# Patient Record
Sex: FEMALE | Race: OTHER | HISPANIC OR LATINO
[De-identification: names, ages, dates, MRNs, and addresses within clinical notes are randomized per-mention and may not be internally consistent; named-entity substitution may affect disease eponyms.]

---

## 2023-08-17 ENCOUNTER — RESULT REVIEW (OUTPATIENT)
Age: 23
End: 2023-08-17

## 2023-08-18 ENCOUNTER — TRANSCRIPTION ENCOUNTER (OUTPATIENT)
Age: 23
End: 2023-08-18

## 2023-08-20 ENCOUNTER — RESULT REVIEW (OUTPATIENT)
Age: 23
End: 2023-08-20

## 2023-08-21 ENCOUNTER — TRANSCRIPTION ENCOUNTER (OUTPATIENT)
Age: 23
End: 2023-08-21

## 2023-08-22 ENCOUNTER — TRANSCRIPTION ENCOUNTER (OUTPATIENT)
Age: 23
End: 2023-08-22

## 2023-09-01 PROBLEM — Z00.00 ENCOUNTER FOR PREVENTIVE HEALTH EXAMINATION: Status: ACTIVE | Noted: 2023-09-01

## 2023-09-05 ENCOUNTER — APPOINTMENT (OUTPATIENT)
Dept: SURGERY | Facility: CLINIC | Age: 23
End: 2023-09-05

## 2023-09-28 ENCOUNTER — APPOINTMENT (OUTPATIENT)
Dept: BARIATRICS | Facility: CLINIC | Age: 23
End: 2023-09-28
Payer: COMMERCIAL

## 2023-09-28 PROCEDURE — 97802 MEDICAL NUTRITION INDIV IN: CPT | Mod: 95

## 2023-09-29 ENCOUNTER — APPOINTMENT (OUTPATIENT)
Dept: BARIATRICS | Facility: CLINIC | Age: 23
End: 2023-09-29
Payer: COMMERCIAL

## 2023-09-29 ENCOUNTER — NON-APPOINTMENT (OUTPATIENT)
Age: 23
End: 2023-09-29

## 2023-09-29 VITALS
WEIGHT: 286 LBS | BODY MASS INDEX: 47.65 KG/M2 | HEART RATE: 90 BPM | HEIGHT: 65 IN | DIASTOLIC BLOOD PRESSURE: 57 MMHG | SYSTOLIC BLOOD PRESSURE: 131 MMHG

## 2023-09-29 DIAGNOSIS — K21.9 GASTRO-ESOPHAGEAL REFLUX DISEASE W/OUT ESOPHAGITIS: ICD-10-CM

## 2023-09-29 PROCEDURE — 99214 OFFICE O/P EST MOD 30 MIN: CPT | Mod: 24

## 2023-10-12 ENCOUNTER — APPOINTMENT (OUTPATIENT)
Dept: BARIATRICS | Facility: HOSPITAL | Age: 23
End: 2023-10-12

## 2023-10-13 ENCOUNTER — APPOINTMENT (OUTPATIENT)
Dept: BARIATRICS | Facility: CLINIC | Age: 23
End: 2023-10-13

## 2023-10-30 ENCOUNTER — APPOINTMENT (OUTPATIENT)
Dept: BARIATRICS | Facility: CLINIC | Age: 23
End: 2023-10-30

## 2023-11-02 ENCOUNTER — APPOINTMENT (OUTPATIENT)
Dept: BARIATRICS | Facility: CLINIC | Age: 23
End: 2023-11-02

## 2023-11-06 ENCOUNTER — APPOINTMENT (OUTPATIENT)
Dept: BARIATRICS | Facility: CLINIC | Age: 23
End: 2023-11-06

## 2023-12-13 ENCOUNTER — RESULT REVIEW (OUTPATIENT)
Age: 23
End: 2023-12-13

## 2023-12-14 ENCOUNTER — RESULT REVIEW (OUTPATIENT)
Age: 23
End: 2023-12-14

## 2023-12-14 ENCOUNTER — APPOINTMENT (OUTPATIENT)
Dept: BARIATRICS | Facility: HOSPITAL | Age: 23
End: 2023-12-14
Payer: COMMERCIAL

## 2023-12-14 PROCEDURE — 43239 EGD BIOPSY SINGLE/MULTIPLE: CPT

## 2023-12-19 ENCOUNTER — APPOINTMENT (OUTPATIENT)
Dept: BARIATRICS | Facility: CLINIC | Age: 23
End: 2023-12-19
Payer: COMMERCIAL

## 2023-12-19 PROCEDURE — 97803 MED NUTRITION INDIV SUBSEQ: CPT | Mod: 95

## 2024-01-02 ENCOUNTER — APPOINTMENT (OUTPATIENT)
Dept: CARDIOLOGY | Facility: CLINIC | Age: 24
End: 2024-01-02
Payer: COMMERCIAL

## 2024-01-02 ENCOUNTER — NON-APPOINTMENT (OUTPATIENT)
Age: 24
End: 2024-01-02

## 2024-01-02 VITALS
OXYGEN SATURATION: 98 % | BODY MASS INDEX: 48.32 KG/M2 | HEART RATE: 72 BPM | DIASTOLIC BLOOD PRESSURE: 75 MMHG | SYSTOLIC BLOOD PRESSURE: 109 MMHG | HEIGHT: 65 IN | WEIGHT: 290 LBS

## 2024-01-02 VITALS — DIASTOLIC BLOOD PRESSURE: 73 MMHG | SYSTOLIC BLOOD PRESSURE: 97 MMHG

## 2024-01-02 DIAGNOSIS — Z13.220 ENCOUNTER FOR SCREENING FOR LIPOID DISORDERS: ICD-10-CM

## 2024-01-02 DIAGNOSIS — F17.290 NICOTINE DEPENDENCE, OTHER TOBACCO PRODUCT, UNCOMPLICATED: ICD-10-CM

## 2024-01-02 PROCEDURE — 99204 OFFICE O/P NEW MOD 45 MIN: CPT

## 2024-01-02 PROCEDURE — 93000 ELECTROCARDIOGRAM COMPLETE: CPT

## 2024-01-02 NOTE — HISTORY OF PRESENT ILLNESS
[Preoperative Visit] : for a medical evaluation prior to surgery [Scheduled Procedure ___] : a [unfilled] [Date of Surgery ___] : on [unfilled] [Surgeon Name ___] : surgeon: [unfilled] [FreeTextEntry1] : Patient denies any history of congenital heart disease or childhood cardiac illnesses.   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 pylori today

## 2024-01-02 NOTE — PHYSICAL EXAM
[Well Groomed] : well groomed [General Appearance - Well Nourished] : well nourished [No Deformities] : no deformities [General Appearance - In No Acute Distress] : no acute distress [Normal Conjunctiva] : the conjunctiva exhibited no abnormalities [Eyelids - No Xanthelasma] : the eyelids demonstrated no xanthelasmas [Normal Oral Mucosa] : normal oral mucosa [No Oral Pallor] : no oral pallor [No Oral Cyanosis] : no oral cyanosis [Normal Jugular Venous A Waves Present] : normal jugular venous A waves present [Normal Jugular Venous V Waves Present] : normal jugular venous V waves present [No Jugular Venous Guardado A Waves] : no jugular venous guardado A waves [Respiration, Rhythm And Depth] : normal respiratory rhythm and effort [Exaggerated Use Of Accessory Muscles For Inspiration] : no accessory muscle use [Auscultation Breath Sounds / Voice Sounds] : lungs were clear to auscultation bilaterally [Heart Rate And Rhythm] : heart rate and rhythm were normal [Heart Sounds] : normal S1 and S2 [Murmurs] : no murmurs present [Abdomen Soft] : soft [Abdomen Tenderness] : non-tender [Abdomen Mass (___ Cm)] : no abdominal mass palpated [Abnormal Walk] : normal gait [Gait - Sufficient For Exercise Testing] : the gait was sufficient for exercise testing [Nail Clubbing] : no clubbing of the fingernails [Cyanosis, Localized] : no localized cyanosis [Petechial Hemorrhages (___cm)] : no petechial hemorrhages [Skin Color & Pigmentation] : normal skin color and pigmentation [] : no rash [No Venous Stasis] : no venous stasis [Skin Lesions] : no skin lesions [No Skin Ulcers] : no skin ulcer [No Xanthoma] : no  xanthoma was observed [Oriented To Time, Place, And Person] : oriented to person, place, and time [Affect] : the affect was normal [Mood] : the mood was normal [No Anxiety] : not feeling anxious [FreeTextEntry1] : Obese

## 2024-01-08 ENCOUNTER — RESULT CHARGE (OUTPATIENT)
Age: 24
End: 2024-01-08

## 2024-01-09 ENCOUNTER — RESULT REVIEW (OUTPATIENT)
Age: 24
End: 2024-01-09

## 2024-01-19 ENCOUNTER — APPOINTMENT (OUTPATIENT)
Dept: CARDIOLOGY | Facility: CLINIC | Age: 24
End: 2024-01-19
Payer: COMMERCIAL

## 2024-01-19 DIAGNOSIS — R06.09 OTHER FORMS OF DYSPNEA: ICD-10-CM

## 2024-01-19 DIAGNOSIS — Z01.810 ENCOUNTER FOR PREPROCEDURAL CARDIOVASCULAR EXAMINATION: ICD-10-CM

## 2024-01-19 PROCEDURE — 93015 CV STRESS TEST SUPVJ I&R: CPT

## 2024-01-29 ENCOUNTER — APPOINTMENT (OUTPATIENT)
Dept: BARIATRICS | Facility: CLINIC | Age: 24
End: 2024-01-29
Payer: COMMERCIAL

## 2024-01-29 PROCEDURE — 97803 MED NUTRITION INDIV SUBSEQ: CPT | Mod: 95

## 2024-02-05 ENCOUNTER — APPOINTMENT (OUTPATIENT)
Dept: BARIATRICS | Facility: CLINIC | Age: 24
End: 2024-02-05
Payer: COMMERCIAL

## 2024-02-05 PROCEDURE — 90791 PSYCH DIAGNOSTIC EVALUATION: CPT

## 2024-02-05 NOTE — CURRENT PSYCHIATRIC SYMPTOMS
[Depressed Mood] : no depressed mood [Anhedonia] : no anhedonia [Guilt] : not feeling guilty [Decreased Concentration] : no decrease in concentrating ability [Hyperphagia] : no hyperphagia [Insomnia] : no insomnia disorder [Hypersomnia] : no ~T hypersomnia [Psychomotor Agitation] : no agitation [Psychomotor Retardation] : no psychomotor retardation [Anorexia] : no anorexia [Euphoria] : no euphoria [Highly Irritable] : no high irritability [Increased Activity] : no increased in activity [Distractibility] : not distracted [Talkativeness] : no talkativeness [Grandeur] : no feelings of grandeur [Buying Sprees] : no buying sprees [Hypersexuality] : denied hypersexuality [Dec Need For Sleep] : no decreased need for sleep [Delusions] : no ~T delusions [Hallucination Visual] : no visual hallucinations [Hallucination Auditory] : no auditory hallucinations [Hallucination Tactile] : no tactile hallucinations [Thought Disorder] : ~T a thought disorder was not noted [Excessive Worry] : no excessive worries [Ruminations] : no rumination disorder [Obsessions] : no obsessions [Re-experiencing] : no re-experiencing [Restlessness] : no restlessness [Hypochondriasis] : no hypochondriasis [Panic] : no panic disorder [FreeTextEntry1] : PPH: was depressed at 14 yrs x 2-3 when she first moved to the [place]. She did not get treatment. Denies any other episodes of depression. Denies depressed mood currently.  No psych med trials. Denies past SIB/SA. Denies current SI/HI Denies past PER visits/psych hospitalizations.  Tobacco: denies smoking cigarettes Occasional hookah smoking Alcohol: denies Drugs: denies  FH: denies; denies FH of addiction.

## 2024-02-05 NOTE — HISTORY OF PRESENT ILLNESS
[de-identified] : CC: "I have been trying to lose weight for more than 5 years."  23 yo F with no PPH, PMH of obesity and asthma, who presents for psychiatric clearance for bariatric surgery.   Weight and Diet history Patient reports that she was not overweight as a child. She started gaining weight when she was 14. At that time, she came to the [place] with her father, leaving her mother and brother in the [place]. She reports that she became depressed and mainly stayed in her room, eating.  Her weight topped at 240 lb around 16 years of age. She started to attempt to lose weight around [age] yrs. She attempted to diet, eat more veggies and lean meats. She reduced consumption of carbs. Patent reports that she lost about 50 lb. She was able to maintain the weight by eating healthy for 2-3 years until she started working nights.  At that point, she started to gain weight due to poor eating habits (eating out). Her current weight is 288 lb. Her highest weight has been 295 lb.   Eating and activity habits For breakfast she has yogurt and fruit. For lunch, she has salad with chicken or rice and beans. For dinner she has a protein shake. She states that she occasionally has small pretzels for a snack and once a month she has chocolate during her period. She describes her portion size as "big". She reports that she has cut out take out food. She reports occasional grazing behavior. On a given day, she typically has 3 meals and 1 snack. Pt reports drinking 1 cup of juice. She denies drinking soda. She buys grocery and cooks during the work week and during the weekend, her grandmother cooks and serves big portions. She does reports eating more when she is depressed. She denies binge eating. She denies night eating syndrome. Denies compensatory behaviors. Denies eating d/o.   Changes made thus far and changes needed for success The patient was aware of some of the permanent lifestyle changes needed to achieve maximum benefit from surgery. She started drinking a protein shake in place of dinner. Patient plans to buy a scale to weight her portions. She has also decreased her portion size. Patient plans to join a gym after surgery.   Knowledge and Motivation for surgery The patient is aware of the nature of bariatric surgery. She was not aware potential risks/benefits and related complications. The patient is aware of the changes that must be made in the eating and lifestyle habits, both short- and long- term. Her target weight is 140-150 lb. She hopes to improve physical endurance and improve fatigue.   Support system A close friend of hers had bariatric surgery last year and is supporting her through this process. Her grandmother will support her at home. She denies any recent or upcoming psychosocial changes. She is able to take PTO from work.   Labs and vitals reviewed.

## 2024-02-05 NOTE — REASON FOR VISIT
[FreeTextEntry2] : Patient with morbid obesity for many years has tried and failed numerous diets and is now seeking surgical weight loss is not the first time she actually did see the [facility] group at [place] about 4 5 years ago but due to concern and anxiety decided not to go through with it she is only gained more weight since and more recently has developed common bile duct stones with cholelithiasis and required ERCP with cholecystectomy at this time she feels she has no other alternative but to proceed with surgical weight loss

## 2024-02-05 NOTE — HISTORY OF PRESENT ILLNESS
[de-identified] : Patient presents for discussion regarding surgical weight loss. Patient has been significantly overweight for   years, has tried and failed numerous non surgical weight loss approaches, and now has or is at significant risk for life threatening medical conditions as a result of their weight. They are now ready to commit to the evaluation process that will lead towards the approval for sleeve gastrectomy. [de-identified] : As already noted patient is 23 years old has morbid obesity with a BMI approaching 48 recently underwent cholecystectomy and common bile duct stone clearance does not use drugs alcohol or smokes she is under no psychological or psychiatric evaluation or counseling at this time and takes no medications

## 2024-02-05 NOTE — SOCIAL HISTORY
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

## 2024-02-05 NOTE — ASSESSMENT
[FreeTextEntry1] : Patient presents for discussion regarding surgical weight loss. Patient has been significantly overweight for   years, has tried and failed numerous non surgical weight loss approaches, and now has or is at significant risk for life threatening medical conditions as a result of their weight. They are now ready to commit to the evaluation process that will lead towards the approval for sleeve gastrectomy. I have ordered lab battery for her additionally have oriented in upper GI x-ray order I will schedule her for an endoscopy she will see our psychiatrist she will also have an upper GI and follow-up with our dietitian we will confirm her insurance requirements which might require a 5% weight loss but that is to be determined as well as a number of dietary visits required I had extensive discussion with her over both gastric sleeve and gastric bypass procedures after hearing the risk benefits and alternatives the patient is elected to proceed with sleeve gastrectomy at this time.  Patient is aware that pregnancy must be avoided and birth control exercised for 18 months after surgery.

## 2024-02-05 NOTE — DISCUSSION/SUMMARY
----- Message from BRUNA Hernandez sent at 11/28/2017 11:20 AM CST -----  Please notify patient of normal test results- both her MRI of the brain and her mammogram were normal. No sign of acute cause of her headaches on MRI. I would recommend that she see a neurologist for further evaluation, considering she continues to have daily severe headaches. Referral placed, she can call physician referral line to schedule with new provider: Physician referral line: [phone].  Thank you!   Sandi   [FreeTextEntry1] : Based on the information provided at this time, there are no psychiatric contraindications to proceeding with bariatric surgery.  It is recommended that the patient review information about the procedure and its associated risks/complications.  [de-identified] : E66.9 - obesity, unspecified F54 - Psychological and behavioral factors associated with disorders or diseases classified elsewhere

## 2024-02-05 NOTE — REASON FOR VISIT
[Initial Consult] : an initial consult for [Morbid Obesity (BMI>40)] : morbid obesity (bmi>40) [Referring By:  ___] : ~[address]~ was referred for psychological evaluation by Dr. MENJIVAR [Attempted Weight Loss] : attempted weight loss [Commitment to Modified Lifestyle] : commitment to a modified lifestyle pre and post surgery [Difficulties with Diet Compliance] : difficulties with diet compliance  [Expectations of Outcome] : expectations of outcome [Motivation for Selecting Surgery] : motivation for selecting surgery [Strength of Social Support System] : strength of social support system [Patient Understands Data May be Shared] : patient understands that the information discussed during the evaluation would be shared with referring provider and possibly with ~his/her~ insurance provider

## 2024-02-15 ENCOUNTER — TRANSCRIPTION ENCOUNTER (OUTPATIENT)
Age: 24
End: 2024-02-15

## 2024-02-15 ENCOUNTER — APPOINTMENT (OUTPATIENT)
Dept: BARIATRICS | Facility: CLINIC | Age: 24
End: 2024-02-15
Payer: COMMERCIAL

## 2024-02-15 VITALS — DIASTOLIC BLOOD PRESSURE: 59 MMHG | HEART RATE: 67 BPM | SYSTOLIC BLOOD PRESSURE: 121 MMHG | OXYGEN SATURATION: 98 %

## 2024-02-15 DIAGNOSIS — A04.8 OTHER SPECIFIED BACTERIAL INTESTINAL INFECTIONS: ICD-10-CM

## 2024-02-15 DIAGNOSIS — K21.9 GASTRO-ESOPHAGEAL REFLUX DISEASE W/OUT ESOPHAGITIS: ICD-10-CM

## 2024-02-15 DIAGNOSIS — Z78.9 OTHER SPECIFIED HEALTH STATUS: ICD-10-CM

## 2024-02-15 PROCEDURE — 99214 OFFICE O/P EST MOD 30 MIN: CPT

## 2024-02-15 NOTE — HISTORY OF PRESENT ILLNESS
[de-identified] : Has completed her evaluations, EGD Nl, H. Pylori treated, UGI nl, Stress test nl, psych cleared, labs fine and will be updated at PST, TTE tomorrow, not on hormones, informed about avoiding pregnancy for 18 months postop, vitamins, protein, and liquids discussed.  The patient has undergone extensive evaluation by a multidisciplinary team and having been found qualified is now ready to proceed with laparoscopic sleeve gastrectomy for weight loss. An extensive discussion including the risks of surgery including: need for compliance with diet, vitamins, followup appointments, and also the risks of anesthesia, bleeding, transfusion, infection, leak, visceral injury, reoperation, open procedure, wound issues, inadequate weight loss or weight regain, GERD, hiatal hernia, cardiopulmonary complications, thromboembolism, revision surgery in the future, failure of resolution of some or all comorbid conditions, and even death. The patient understands and wishes to proceed.

## 2024-02-15 NOTE — ASSESSMENT
[FreeTextEntry1] : The patient has undergone extensive evaluation by a multidisciplinary team and having been found qualified is now ready to proceed with laparoscopic sleeve gastrectomy for weight loss. An extensive discussion including the risks of surgery including: need for compliance with diet, vitamins, followup appointments, and also the risks of anesthesia, bleeding, transfusion, infection, leak, visceral injury, reoperation, open procedure, wound issues, inadequate weight loss or weight regain, GERD, hiatal hernia, cardiopulmonary complications, thromboembolism, revision surgery in the future, failure of resolution of some or all comorbid conditions, and even death. The patient understands and wishes to proceed. BSTOP discussed, pain level 0 today, no narcotic use. Need TTE per cardiology, nl stress, need PST appointment, proceed with sleeve. 2 weeks Eliquis, PPI, zofran postop. no hormones, nop prior DVT. 60 minutes spent with patient today.

## 2024-02-16 ENCOUNTER — RESULT REVIEW (OUTPATIENT)
Age: 24
End: 2024-02-16

## 2024-02-21 ENCOUNTER — RESULT REVIEW (OUTPATIENT)
Age: 24
End: 2024-02-21

## 2024-02-26 ENCOUNTER — NON-APPOINTMENT (OUTPATIENT)
Age: 24
End: 2024-02-26

## 2024-02-26 ENCOUNTER — APPOINTMENT (OUTPATIENT)
Dept: CARDIOLOGY | Facility: CLINIC | Age: 24
End: 2024-02-26

## 2024-02-26 DIAGNOSIS — Z92.89 PERSONAL HISTORY OF OTHER MEDICAL TREATMENT: ICD-10-CM

## 2024-02-27 ENCOUNTER — APPOINTMENT (OUTPATIENT)
Dept: BARIATRICS | Facility: CLINIC | Age: 24
End: 2024-02-27

## 2024-02-28 ENCOUNTER — APPOINTMENT (OUTPATIENT)
Dept: BARIATRICS | Facility: CLINIC | Age: 24
End: 2024-02-28
Payer: COMMERCIAL

## 2024-02-28 PROCEDURE — 97803 MED NUTRITION INDIV SUBSEQ: CPT | Mod: 95

## 2024-03-05 ENCOUNTER — RESULT REVIEW (OUTPATIENT)
Age: 24
End: 2024-03-05

## 2024-03-05 RX ORDER — AMOXICILLIN 500 MG/1
500 TABLET, FILM COATED ORAL TWICE DAILY
Qty: 56 | Refills: 0 | Status: COMPLETED | COMMUNITY
Start: 2023-12-19 | End: 2024-03-05

## 2024-03-05 RX ORDER — CLARITHROMYCIN 500 MG/1
500 TABLET, FILM COATED ORAL TWICE DAILY
Qty: 28 | Refills: 0 | Status: COMPLETED | COMMUNITY
Start: 2023-12-19 | End: 2024-03-05

## 2024-03-05 RX ORDER — OMEPRAZOLE 20 MG/1
20 CAPSULE, DELAYED RELEASE ORAL TWICE DAILY
Qty: 28 | Refills: 0 | Status: COMPLETED | COMMUNITY
Start: 2023-12-19 | End: 2024-03-05

## 2024-03-06 ENCOUNTER — APPOINTMENT (OUTPATIENT)
Dept: BARIATRICS | Facility: HOSPITAL | Age: 24
End: 2024-03-06
Payer: COMMERCIAL

## 2024-03-06 ENCOUNTER — TRANSCRIPTION ENCOUNTER (OUTPATIENT)
Age: 24
End: 2024-03-06

## 2024-03-06 PROCEDURE — 43775 LAP SLEEVE GASTRECTOMY: CPT

## 2024-03-07 ENCOUNTER — NON-APPOINTMENT (OUTPATIENT)
Age: 24
End: 2024-03-07

## 2024-03-07 ENCOUNTER — TRANSCRIPTION ENCOUNTER (OUTPATIENT)
Age: 24
End: 2024-03-07

## 2024-03-12 ENCOUNTER — APPOINTMENT (OUTPATIENT)
Dept: BARIATRICS | Facility: CLINIC | Age: 24
End: 2024-03-12
Payer: COMMERCIAL

## 2024-03-12 VITALS
SYSTOLIC BLOOD PRESSURE: 113 MMHG | OXYGEN SATURATION: 97 % | RESPIRATION RATE: 18 BRPM | DIASTOLIC BLOOD PRESSURE: 73 MMHG | BODY MASS INDEX: 46.5 KG/M2 | WEIGHT: 279.1 LBS | HEART RATE: 97 BPM | HEIGHT: 65 IN

## 2024-03-12 DIAGNOSIS — Z87.09 PERSONAL HISTORY OF OTHER DISEASES OF THE RESPIRATORY SYSTEM: ICD-10-CM

## 2024-03-12 PROCEDURE — 99024 POSTOP FOLLOW-UP VISIT: CPT

## 2024-03-12 NOTE — HISTORY OF PRESENT ILLNESS
[de-identified] : sleeve 6 days ago, doing great. Moving bowels, tolerating phase 1 fine, PPI. vits, eliquis all fine. 0 pain. Took only prn tylenol postop. Incisions are excellent. [Procedure: ___] : Procedure performed: [unfilled]  [Date of Surgery: ___] : Date of Surgery:   [unfilled] [Surgeon Name:   ___] : Surgeon Name: Dr. MENJIVAR [Pre-Op Weight ___] : Pre-op weight was [unfilled] lbs [___ Days Post Op] : [unfilled] days

## 2024-03-12 NOTE — ASSESSMENT
[FreeTextEntry1] : No issues or concerns after sleeve, advancing per protocol. Vitamins, protein shakes, clear fluid amount all fine. No Gerd. Taking PPI and eliquis(will stop after 2 weeks). 6 week f/u, UGI ordered per routine. WW

## 2024-03-12 NOTE — REASON FOR VISIT
[Post Operative Visit] : a post operative visit for [FreeTextEntry2] : pod 6 sleeve [S/P Bariatric Surgery] : s/p bariatric surgery

## 2024-03-25 ENCOUNTER — APPOINTMENT (OUTPATIENT)
Dept: BARIATRICS | Facility: CLINIC | Age: 24
End: 2024-03-25
Payer: COMMERCIAL

## 2024-03-25 PROCEDURE — 97803 MED NUTRITION INDIV SUBSEQ: CPT | Mod: 95

## 2024-04-01 ENCOUNTER — RESULT REVIEW (OUTPATIENT)
Age: 24
End: 2024-04-01

## 2024-04-08 ENCOUNTER — APPOINTMENT (OUTPATIENT)
Dept: BARIATRICS | Facility: CLINIC | Age: 24
End: 2024-04-08
Payer: COMMERCIAL

## 2024-04-08 PROCEDURE — 97803 MED NUTRITION INDIV SUBSEQ: CPT | Mod: 95

## 2024-04-23 ENCOUNTER — APPOINTMENT (OUTPATIENT)
Dept: BARIATRICS | Facility: CLINIC | Age: 24
End: 2024-04-23
Payer: COMMERCIAL

## 2024-05-02 ENCOUNTER — APPOINTMENT (OUTPATIENT)
Dept: BARIATRICS | Facility: CLINIC | Age: 24
End: 2024-05-02
Payer: COMMERCIAL

## 2024-05-02 VITALS
DIASTOLIC BLOOD PRESSURE: 63 MMHG | OXYGEN SATURATION: 98 % | BODY MASS INDEX: 42.39 KG/M2 | HEART RATE: 72 BPM | HEIGHT: 65 IN | WEIGHT: 254.41 LBS | SYSTOLIC BLOOD PRESSURE: 118 MMHG

## 2024-05-02 PROCEDURE — 99024 POSTOP FOLLOW-UP VISIT: CPT

## 2024-05-02 RX ORDER — POLYETHYLENE GLYCOL 3350 17 G/17G
17 POWDER, FOR SOLUTION ORAL DAILY
Qty: 3 | Refills: 0 | Status: COMPLETED | COMMUNITY
Start: 2024-05-02 | End: 2024-05-05

## 2024-05-02 NOTE — ASSESSMENT
[FreeTextEntry1] : Doing well, excellent weight loss, will try benefiber and MiraLAX briefly. 2 month f/u and will check labs.

## 2024-05-02 NOTE — HISTORY OF PRESENT ILLNESS
[de-identified] : doing well after sleeve, down 35lbs, tolerating diet fine, no gerd, taking vits. Feels well. Only issue is constipation. Getting adequate protein > 60gm daily.

## 2024-06-10 ENCOUNTER — APPOINTMENT (OUTPATIENT)
Dept: BARIATRICS | Facility: CLINIC | Age: 24
End: 2024-06-10
Payer: COMMERCIAL

## 2024-06-10 PROCEDURE — 97803 MED NUTRITION INDIV SUBSEQ: CPT | Mod: 95

## 2024-07-02 ENCOUNTER — APPOINTMENT (OUTPATIENT)
Dept: BARIATRICS | Facility: CLINIC | Age: 24
End: 2024-07-02
Payer: COMMERCIAL

## 2024-07-02 VITALS
WEIGHT: 227.6 LBS | OXYGEN SATURATION: 97 % | SYSTOLIC BLOOD PRESSURE: 103 MMHG | DIASTOLIC BLOOD PRESSURE: 64 MMHG | TEMPERATURE: 98.3 F | HEIGHT: 65 IN | HEART RATE: 67 BPM | BODY MASS INDEX: 37.92 KG/M2

## 2024-07-02 DIAGNOSIS — E66.01 MORBID (SEVERE) OBESITY DUE TO EXCESS CALORIES: ICD-10-CM

## 2024-07-02 DIAGNOSIS — Z87.19 PERSONAL HISTORY OF OTHER DISEASES OF THE DIGESTIVE SYSTEM: ICD-10-CM

## 2024-07-02 DIAGNOSIS — K59.00 CONSTIPATION, UNSPECIFIED: ICD-10-CM

## 2024-07-02 DIAGNOSIS — Z90.3 ACQUIRED ABSENCE OF STOMACH [PART OF]: ICD-10-CM

## 2024-07-02 PROCEDURE — 99213 OFFICE O/P EST LOW 20 MIN: CPT

## 2024-07-02 RX ORDER — POLYETHYLENE GLYCOL 3350 17 G/17G
17 POWDER, FOR SOLUTION ORAL
Qty: 1 | Refills: 0 | Status: ACTIVE | COMMUNITY
Start: 2024-07-02 | End: 1900-01-01

## 2024-08-06 ENCOUNTER — APPOINTMENT (OUTPATIENT)
Dept: BARIATRICS | Facility: CLINIC | Age: 24
End: 2024-08-06

## 2024-09-03 ENCOUNTER — APPOINTMENT (OUTPATIENT)
Dept: BARIATRICS | Facility: CLINIC | Age: 24
End: 2024-09-03

## 2024-11-25 ENCOUNTER — NON-APPOINTMENT (OUTPATIENT)
Age: 24
End: 2024-11-25

## 2025-01-29 ENCOUNTER — NON-APPOINTMENT (OUTPATIENT)
Age: 25
End: 2025-01-29

## 2025-03-28 ENCOUNTER — NON-APPOINTMENT (OUTPATIENT)
Age: 25
End: 2025-03-28

## 2025-03-31 ENCOUNTER — NON-APPOINTMENT (OUTPATIENT)
Age: 25
End: 2025-03-31

## 2025-04-02 ENCOUNTER — APPOINTMENT (OUTPATIENT)
Dept: OBGYN | Facility: CLINIC | Age: 25
End: 2025-04-02
Payer: COMMERCIAL

## 2025-04-02 ENCOUNTER — LABORATORY RESULT (OUTPATIENT)
Age: 25
End: 2025-04-02

## 2025-04-02 VITALS
HEIGHT: 65 IN | DIASTOLIC BLOOD PRESSURE: 70 MMHG | WEIGHT: 211 LBS | BODY MASS INDEX: 35.16 KG/M2 | SYSTOLIC BLOOD PRESSURE: 114 MMHG

## 2025-04-02 DIAGNOSIS — Z11.3 ENCOUNTER FOR SCREENING FOR INFECTIONS WITH A PREDOMINANTLY SEXUAL MODE OF TRANSMISSION: ICD-10-CM

## 2025-04-02 DIAGNOSIS — Z11.51 ENCOUNTER FOR SCREENING FOR HUMAN PAPILLOMAVIRUS (HPV): ICD-10-CM

## 2025-04-02 DIAGNOSIS — Z12.4 ENCOUNTER FOR SCREENING FOR MALIGNANT NEOPLASM OF CERVIX: ICD-10-CM

## 2025-04-02 DIAGNOSIS — Z01.419 ENCOUNTER FOR GYNECOLOGICAL EXAMINATION (GENERAL) (ROUTINE) W/OUT ABNORMAL FINDINGS: ICD-10-CM

## 2025-04-02 PROCEDURE — 99385 PREV VISIT NEW AGE 18-39: CPT

## 2025-04-02 PROCEDURE — 99459 PELVIC EXAMINATION: CPT

## 2025-04-03 LAB
C TRACH RRNA SPEC QL NAA+PROBE: NOT DETECTED
HIV1+2 AB SPEC QL IA.RAPID: NONREACTIVE
HPV HIGH+LOW RISK DNA PNL CVX: DETECTED
N GONORRHOEA RRNA SPEC QL NAA+PROBE: NOT DETECTED
SOURCE TP AMPLIFICATION: NORMAL
T PALLIDUM AB SER QL IA: NEGATIVE
T VAGINALIS RRNA SPEC QL NAA+PROBE: NOT DETECTED

## 2025-04-04 LAB
HBV SURFACE AG SER QL: NONREACTIVE
HCV AB SER QL: NONREACTIVE
HCV S/CO RATIO: 0.13 S/CO

## 2025-04-07 LAB — CYTOLOGY CVX/VAG DOC THIN PREP: NORMAL

## 2025-04-08 ENCOUNTER — APPOINTMENT (OUTPATIENT)
Dept: OBGYN | Facility: CLINIC | Age: 25
End: 2025-04-08